# Patient Record
Sex: FEMALE
[De-identification: names, ages, dates, MRNs, and addresses within clinical notes are randomized per-mention and may not be internally consistent; named-entity substitution may affect disease eponyms.]

---

## 2017-08-30 ENCOUNTER — APPOINTMENT (OUTPATIENT)
Dept: OBGYN | Facility: CLINIC | Age: 22
End: 2017-08-30
Payer: COMMERCIAL

## 2017-08-30 VITALS
BODY MASS INDEX: 19.84 KG/M2 | HEIGHT: 63 IN | SYSTOLIC BLOOD PRESSURE: 102 MMHG | WEIGHT: 112 LBS | DIASTOLIC BLOOD PRESSURE: 64 MMHG

## 2017-08-30 DIAGNOSIS — O92.6 GALACTORRHEA: ICD-10-CM

## 2017-08-30 DIAGNOSIS — Z01.419 ENCOUNTER FOR GYNECOLOGICAL EXAMINATION (GENERAL) (ROUTINE) W/OUT ABNORMAL FINDINGS: ICD-10-CM

## 2017-08-30 PROCEDURE — 36415 COLL VENOUS BLD VENIPUNCTURE: CPT

## 2017-08-30 PROCEDURE — 99395 PREV VISIT EST AGE 18-39: CPT

## 2017-08-30 PROCEDURE — 99213 OFFICE O/P EST LOW 20 MIN: CPT | Mod: 25

## 2017-08-31 ENCOUNTER — OTHER (OUTPATIENT)
Age: 22
End: 2017-08-31

## 2017-08-31 ENCOUNTER — TRANSCRIPTION ENCOUNTER (OUTPATIENT)
Age: 22
End: 2017-08-31

## 2017-09-05 LAB
CYTOLOGY CVX/VAG DOC THIN PREP: NORMAL
PROLACTIN SERPL-MCNC: 17.7 NG/ML
TSH SERPL-ACNC: 1.85 UIU/ML

## 2024-09-20 ENCOUNTER — APPOINTMENT (OUTPATIENT)
Dept: OTOLARYNGOLOGY | Facility: CLINIC | Age: 29
End: 2024-09-20
Payer: MEDICAID

## 2024-09-20 VITALS
BODY MASS INDEX: 20.38 KG/M2 | HEIGHT: 63 IN | WEIGHT: 115 LBS | SYSTOLIC BLOOD PRESSURE: 112 MMHG | DIASTOLIC BLOOD PRESSURE: 70 MMHG | HEART RATE: 80 BPM

## 2024-09-20 DIAGNOSIS — E23.7 DISORDER OF PITUITARY GLAND, UNSPECIFIED: ICD-10-CM

## 2024-09-20 DIAGNOSIS — R42 DIZZINESS AND GIDDINESS: ICD-10-CM

## 2024-09-20 PROCEDURE — 31231 NASAL ENDOSCOPY DX: CPT

## 2024-09-20 PROCEDURE — 99204 OFFICE O/P NEW MOD 45 MIN: CPT | Mod: 25

## 2024-09-20 NOTE — PLAN
[TextEntry] : I do not believe her pituitary lesion is related to her dizziness.   I will refer her to Dr. Malone for an opinion regarding her ptiuitary lesion, but I do not recommend resection/drainage.   I recommend she continue to fu with endo given her hx of elevated PRL, now normal, but lactation.   I have referred her to otology for further management of her dizziness. I also recommended she keep her appt with psychiatric and neurology.

## 2024-09-20 NOTE — HISTORY OF PRESENT ILLNESS
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 SHe reports that she has had dysequilirbium for many months. This is significantly affecting her lifestyle. She is dizzy almost all of the time. She cannot walk. She also feels like she has to pop her ears, but she is hearing normally. She reports floaters in her eyes, but has seen ophtho who reports normal eye exam. She reports her ears feel wet. She has a history of lactation several years ago - at that time had elevated PRL. Her most recent prolactin level is WNL (20s). She has tried dramamine, which makes her dizziness worse. She has been to the ER several times. 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

## 2024-10-01 ENCOUNTER — APPOINTMENT (OUTPATIENT)
Dept: OTOLARYNGOLOGY | Facility: CLINIC | Age: 29
End: 2024-10-01
Payer: MEDICAID

## 2024-10-01 VITALS — HEIGHT: 63 IN | WEIGHT: 115 LBS | BODY MASS INDEX: 20.38 KG/M2

## 2024-10-01 DIAGNOSIS — F41.9 ANXIETY DISORDER, UNSPECIFIED: ICD-10-CM

## 2024-10-01 DIAGNOSIS — G43.809 OTHER MIGRAINE, NOT INTRACTABLE, W/OUT STATUS MIGRAINOSUS: ICD-10-CM

## 2024-10-01 DIAGNOSIS — H90.12 CONDUCTIVE HEARING LOSS, UNILATERAL, LEFT EAR, WITH UNRESTRICTED HEARING ON THE CONTRALATERAL SIDE: ICD-10-CM

## 2024-10-01 PROCEDURE — 92557 COMPREHENSIVE HEARING TEST: CPT

## 2024-10-01 PROCEDURE — 99214 OFFICE O/P EST MOD 30 MIN: CPT

## 2024-10-01 PROCEDURE — 92567 TYMPANOMETRY: CPT

## 2024-10-01 PROCEDURE — 92504 EAR MICROSCOPY EXAMINATION: CPT

## 2024-10-01 RX ORDER — ZOLPIDEM TARTRATE 5 MG/1
5 TABLET ORAL
Qty: 30 | Refills: 0 | Status: ACTIVE | COMMUNITY
Start: 2024-10-01 | End: 1900-01-01

## 2024-10-01 RX ORDER — ESCITALOPRAM OXALATE 5 MG/1
5 TABLET ORAL
Qty: 90 | Refills: 3 | Status: ACTIVE | COMMUNITY
Start: 2024-10-01 | End: 1900-01-01

## 2024-10-01 NOTE — HISTORY OF PRESENT ILLNESS
[de-identified] : 29 year old female referred by Dr. Serra for dizziness Reports that she has had disequilibrium since 8/18/24, came on randomly where she couldnt stand or walk due to the entire room spinning She started taking Inner Ear Plus, vitamin supplement, which has helped improve her dizziness however she is still experiencing dizziness Wearing dizzy bands daily This is severely impacting her life  She will get hot/cold and "trembly," feels like shes floating on a cloud Has constant tinnitus Feels a sensation that her "head is bleeding," down into her neck and back when she feels dizziness Went to ER x3 who gave her xanax making anxiety and symptoms worse She does not feel she suffers from anxiety but family member with her today thinks otherwise  When she uses flonase she feels better Denies otalgia, otorrhea, ear infections

## 2024-10-01 NOTE — DATA REVIEWED
[de-identified] : An audiogram was ordered and performed including pure tones, tympanometry and speech testing for the patients complaint of L hearing loss, vertigo I have independently reviewed the patient's audiogram from today and my findings include L CHL

## 2024-10-01 NOTE — HISTORY OF PRESENT ILLNESS
[de-identified] : 29 year old female referred by Dr. Serra for dizziness Reports that she has had disequilibrium since 8/18/24, came on randomly where she couldnt stand or walk due to the entire room spinning She started taking Inner Ear Plus, vitamin supplement, which has helped improve her dizziness however she is still experiencing dizziness Wearing dizzy bands daily This is severely impacting her life  She will get hot/cold and "trembly," feels like shes floating on a cloud Has constant tinnitus Feels a sensation that her "head is bleeding," down into her neck and back when she feels dizziness Went to ER x3 who gave her xanax making anxiety and symptoms worse She does not feel she suffers from anxiety but family member with her today thinks otherwise  When she uses flonase she feels better Denies otalgia, otorrhea, ear infections

## 2024-10-01 NOTE — PHYSICAL EXAM
[Binocular Microscopic Exam] : Binocular microscopic exam was performed [Hearing Meier Test (Tuning Fork On Forehead)] : no lateralization of tone [Hearing Loss Right Only] : normal [Hearing Loss Left Only] : normal [Rinne Test Air Conduction Persists > Bone Conduction Right] : air conduction greater than bone conduction on the right [Rinne Test Air Conduction Persists > Bone Conduction Left] : bone conduction greater than air conduction on the left [Meier Test Lateralizes To Left] : tone lateralization to the left [Nystagmus] : ~T no ~M nystagmus was seen [Fukuda Step Test] : Fukuda Step Test was Positive [Romberg's Sign] : Romberg's sign was absent [Fistula Sign] : Fistula Sign: Negative [Past-Pointing] : Past-Pointing: Negative [Valarie-Hallmaryanneke] : Telephone-Hallpike: Negative [FreeTextEntry5] : TF to L, BC>AC on L [FreeTextEntry1] : +fukuda to L [Normal] : no rashes

## 2024-10-01 NOTE — DATA REVIEWED
[de-identified] : An audiogram was ordered and performed including pure tones, tympanometry and speech testing for the patients complaint of L hearing loss, vertigo I have independently reviewed the patient's audiogram from today and my findings include L CHL

## 2024-10-01 NOTE — PHYSICAL EXAM
[Binocular Microscopic Exam] : Binocular microscopic exam was performed [Hearing Meier Test (Tuning Fork On Forehead)] : no lateralization of tone [Hearing Loss Right Only] : normal [Hearing Loss Left Only] : normal [Rinne Test Air Conduction Persists > Bone Conduction Right] : air conduction greater than bone conduction on the right [Rinne Test Air Conduction Persists > Bone Conduction Left] : bone conduction greater than air conduction on the left [Meier Test Lateralizes To Left] : tone lateralization to the left [Nystagmus] : ~T no ~M nystagmus was seen [Fukuda Step Test] : Fukuda Step Test was Positive [Romberg's Sign] : Romberg's sign was absent [Fistula Sign] : Fistula Sign: Negative [Past-Pointing] : Past-Pointing: Negative [Valarie-Hallmaryanneke] : [place]-Hallpike: Negative [FreeTextEntry5] : TF to L, BC>AC on L [FreeTextEntry1] : +fukuda to L [Normal] : no rashes

## 2024-10-16 ENCOUNTER — APPOINTMENT (OUTPATIENT)
Dept: NEUROSURGERY | Facility: CLINIC | Age: 29
End: 2024-10-16
Payer: MEDICAID

## 2024-10-16 VITALS
HEIGHT: 63 IN | BODY MASS INDEX: 20.38 KG/M2 | HEART RATE: 69 BPM | OXYGEN SATURATION: 100 % | WEIGHT: 115 LBS | SYSTOLIC BLOOD PRESSURE: 99 MMHG | DIASTOLIC BLOOD PRESSURE: 66 MMHG

## 2024-10-16 DIAGNOSIS — R42 DIZZINESS AND GIDDINESS: ICD-10-CM

## 2024-10-16 DIAGNOSIS — N64.3 GALACTORRHEA NOT ASSOCIATED WITH CHILDBIRTH: ICD-10-CM

## 2024-10-16 DIAGNOSIS — E23.7 DISORDER OF PITUITARY GLAND, UNSPECIFIED: ICD-10-CM

## 2024-10-16 DIAGNOSIS — F41.9 ANXIETY DISORDER, UNSPECIFIED: ICD-10-CM

## 2024-10-16 DIAGNOSIS — H90.12 CONDUCTIVE HEARING LOSS, UNILATERAL, LEFT EAR, WITH UNRESTRICTED HEARING ON THE CONTRALATERAL SIDE: ICD-10-CM

## 2024-10-16 PROCEDURE — 99203 OFFICE O/P NEW LOW 30 MIN: CPT

## 2024-10-17 PROBLEM — N64.3 GALACTORRHEA: Status: ACTIVE | Noted: 2017-08-30

## 2024-10-31 ENCOUNTER — APPOINTMENT (OUTPATIENT)
Dept: ENDOCRINOLOGY | Facility: CLINIC | Age: 29
End: 2024-10-31

## 2024-11-15 ENCOUNTER — APPOINTMENT (OUTPATIENT)
Dept: OTOLARYNGOLOGY | Facility: CLINIC | Age: 29
End: 2024-11-15
Payer: MEDICAID

## 2024-11-15 VITALS
SYSTOLIC BLOOD PRESSURE: 103 MMHG | RESPIRATION RATE: 17 BRPM | DIASTOLIC BLOOD PRESSURE: 67 MMHG | WEIGHT: 115 LBS | HEIGHT: 63 IN | HEART RATE: 76 BPM | OXYGEN SATURATION: 97 % | BODY MASS INDEX: 20.38 KG/M2

## 2024-11-15 DIAGNOSIS — G43.809 OTHER MIGRAINE, NOT INTRACTABLE, W/OUT STATUS MIGRAINOSUS: ICD-10-CM

## 2024-11-15 DIAGNOSIS — F41.9 ANXIETY DISORDER, UNSPECIFIED: ICD-10-CM

## 2024-11-15 DIAGNOSIS — R42 DIZZINESS AND GIDDINESS: ICD-10-CM

## 2024-11-15 PROCEDURE — 99213 OFFICE O/P EST LOW 20 MIN: CPT

## 2024-12-03 ENCOUNTER — APPOINTMENT (OUTPATIENT)
Dept: OTOLARYNGOLOGY | Facility: CLINIC | Age: 29
End: 2024-12-03

## 2024-12-17 ENCOUNTER — APPOINTMENT (OUTPATIENT)
Dept: CARDIOLOGY | Facility: CLINIC | Age: 29
End: 2024-12-17
Payer: COMMERCIAL

## 2024-12-17 ENCOUNTER — NON-APPOINTMENT (OUTPATIENT)
Age: 29
End: 2024-12-17

## 2024-12-17 VITALS
TEMPERATURE: 97.9 F | RESPIRATION RATE: 17 BRPM | SYSTOLIC BLOOD PRESSURE: 93 MMHG | WEIGHT: 108 LBS | BODY MASS INDEX: 19.14 KG/M2 | OXYGEN SATURATION: 99 % | HEIGHT: 63 IN | DIASTOLIC BLOOD PRESSURE: 60 MMHG | HEART RATE: 70 BPM

## 2024-12-17 DIAGNOSIS — R07.89 OTHER CHEST PAIN: ICD-10-CM

## 2024-12-17 PROCEDURE — 99204 OFFICE O/P NEW MOD 45 MIN: CPT | Mod: 25

## 2024-12-17 PROCEDURE — 93000 ELECTROCARDIOGRAM COMPLETE: CPT

## 2025-01-23 ENCOUNTER — APPOINTMENT (OUTPATIENT)
Dept: CARDIOLOGY | Facility: CLINIC | Age: 30
End: 2025-01-23
Payer: MEDICAID

## 2025-01-23 PROCEDURE — 93306 TTE W/DOPPLER COMPLETE: CPT

## 2025-01-29 ENCOUNTER — APPOINTMENT (OUTPATIENT)
Dept: PULMONOLOGY | Facility: CLINIC | Age: 30
End: 2025-01-29
Payer: MEDICAID

## 2025-01-29 VITALS
DIASTOLIC BLOOD PRESSURE: 78 MMHG | HEIGHT: 63 IN | HEART RATE: 72 BPM | OXYGEN SATURATION: 99 % | WEIGHT: 115 LBS | SYSTOLIC BLOOD PRESSURE: 118 MMHG | BODY MASS INDEX: 20.38 KG/M2

## 2025-01-29 DIAGNOSIS — R07.89 OTHER CHEST PAIN: ICD-10-CM

## 2025-01-29 PROCEDURE — 94729 DIFFUSING CAPACITY: CPT

## 2025-01-29 PROCEDURE — 99205 OFFICE O/P NEW HI 60 MIN: CPT | Mod: 25

## 2025-01-29 PROCEDURE — 94010 BREATHING CAPACITY TEST: CPT

## 2025-01-29 PROCEDURE — 94727 GAS DIL/WSHOT DETER LNG VOL: CPT

## 2025-01-29 RX ORDER — ALBUTEROL SULFATE 90 UG/1
108 (90 BASE) INHALANT RESPIRATORY (INHALATION)
Qty: 1 | Refills: 2 | Status: ACTIVE | COMMUNITY
Start: 2025-01-29 | End: 1900-01-01

## 2025-02-07 ENCOUNTER — APPOINTMENT (OUTPATIENT)
Dept: CT IMAGING | Facility: CLINIC | Age: 30
End: 2025-02-07

## 2025-02-12 ENCOUNTER — APPOINTMENT (OUTPATIENT)
Dept: OTOLARYNGOLOGY | Facility: CLINIC | Age: 30
End: 2025-02-12
Payer: MEDICAID

## 2025-02-12 ENCOUNTER — APPOINTMENT (OUTPATIENT)
Dept: PULMONOLOGY | Facility: CLINIC | Age: 30
End: 2025-02-12

## 2025-02-12 PROCEDURE — 98013 SYNCH AUDIO-ONLY EST LOW 20: CPT

## 2025-02-26 ENCOUNTER — APPOINTMENT (OUTPATIENT)
Dept: CT IMAGING | Facility: CLINIC | Age: 30
End: 2025-02-26
Payer: MEDICAID

## 2025-02-26 PROCEDURE — 71275 CT ANGIOGRAPHY CHEST: CPT

## 2025-03-05 ENCOUNTER — APPOINTMENT (OUTPATIENT)
Dept: PULMONOLOGY | Facility: CLINIC | Age: 30
End: 2025-03-05
Payer: MEDICAID

## 2025-03-05 PROCEDURE — 99213 OFFICE O/P EST LOW 20 MIN: CPT | Mod: 95

## 2025-03-06 DIAGNOSIS — R06.2 WHEEZING: ICD-10-CM

## 2025-03-06 RX ORDER — FLUTICASONE FUROATE 100 UG/1
100 POWDER RESPIRATORY (INHALATION)
Qty: 1 | Refills: 3 | Status: ACTIVE | COMMUNITY
Start: 2025-03-06 | End: 1900-01-01

## 2025-03-06 RX ORDER — LEVALBUTEROL TARTRATE 45 UG/1
45 AEROSOL, METERED ORAL
Qty: 1 | Refills: 2 | Status: ACTIVE | COMMUNITY
Start: 2025-03-06 | End: 1900-01-01

## 2025-03-11 RX ORDER — LEVALBUTEROL TARTRATE 45 UG/1
45 AEROSOL, METERED ORAL
Qty: 1 | Refills: 2 | Status: ACTIVE | COMMUNITY
Start: 2025-03-11 | End: 1900-01-01

## 2025-03-26 ENCOUNTER — APPOINTMENT (OUTPATIENT)
Dept: OTOLARYNGOLOGY | Facility: CLINIC | Age: 30
End: 2025-03-26
Payer: MEDICAID

## 2025-03-26 PROCEDURE — 99212 OFFICE O/P EST SF 10 MIN: CPT

## 2025-06-19 ENCOUNTER — APPOINTMENT (OUTPATIENT)
Dept: PULMONOLOGY | Facility: CLINIC | Age: 30
End: 2025-06-19
Payer: MEDICAID

## 2025-06-19 VITALS
DIASTOLIC BLOOD PRESSURE: 67 MMHG | WEIGHT: 119 LBS | OXYGEN SATURATION: 98 % | HEIGHT: 63 IN | BODY MASS INDEX: 21.09 KG/M2 | SYSTOLIC BLOOD PRESSURE: 106 MMHG | HEART RATE: 78 BPM | RESPIRATION RATE: 16 BRPM

## 2025-06-19 PROBLEM — R09.81 SINUS CONGESTION: Status: ACTIVE | Noted: 2025-06-19

## 2025-06-19 PROCEDURE — 99215 OFFICE O/P EST HI 40 MIN: CPT

## 2025-06-19 PROCEDURE — 94010 BREATHING CAPACITY TEST: CPT

## 2025-06-19 RX ORDER — MOMETASONE FUROATE 100 UG/1
100 AEROSOL RESPIRATORY (INHALATION) TWICE DAILY
Qty: 1 | Refills: 2 | Status: ACTIVE | COMMUNITY
Start: 2025-06-19 | End: 1900-01-01

## 2025-06-19 RX ORDER — LEVOCETIRIZINE DIHYDROCHLORIDE 5 MG/1
5 TABLET ORAL
Qty: 90 | Refills: 2 | Status: ACTIVE | COMMUNITY
Start: 2025-06-19 | End: 1900-01-01

## 2025-06-19 RX ORDER — IPRATROPIUM BROMIDE 21 UG/1
0.03 SPRAY, METERED NASAL
Qty: 1 | Refills: 1 | Status: ACTIVE | COMMUNITY
Start: 2025-06-19 | End: 1900-01-01

## 2025-06-24 ENCOUNTER — APPOINTMENT (OUTPATIENT)
Dept: OTOLARYNGOLOGY | Facility: CLINIC | Age: 30
End: 2025-06-24
Payer: MEDICAID

## 2025-06-24 VITALS
HEART RATE: 67 BPM | SYSTOLIC BLOOD PRESSURE: 108 MMHG | BODY MASS INDEX: 21.09 KG/M2 | OXYGEN SATURATION: 100 % | WEIGHT: 119 LBS | DIASTOLIC BLOOD PRESSURE: 66 MMHG | HEIGHT: 63 IN

## 2025-06-24 PROBLEM — R06.00 DYSPNEA: Status: ACTIVE | Noted: 2025-06-24

## 2025-06-24 PROCEDURE — 99205 OFFICE O/P NEW HI 60 MIN: CPT

## 2025-06-24 PROCEDURE — 99215 OFFICE O/P EST HI 40 MIN: CPT

## 2025-07-02 ENCOUNTER — APPOINTMENT (OUTPATIENT)
Dept: OTOLARYNGOLOGY | Facility: CLINIC | Age: 30
End: 2025-07-02
Payer: MEDICAID

## 2025-07-02 PROCEDURE — 99213 OFFICE O/P EST LOW 20 MIN: CPT | Mod: 93

## 2025-08-26 ENCOUNTER — APPOINTMENT (OUTPATIENT)
Dept: PULMONOLOGY | Facility: CLINIC | Age: 30
End: 2025-08-26
Payer: MEDICAID

## 2025-08-26 VITALS
HEIGHT: 63 IN | HEART RATE: 59 BPM | BODY MASS INDEX: 21.01 KG/M2 | RESPIRATION RATE: 18 BRPM | TEMPERATURE: 98.3 F | SYSTOLIC BLOOD PRESSURE: 102 MMHG | OXYGEN SATURATION: 99 % | DIASTOLIC BLOOD PRESSURE: 58 MMHG | WEIGHT: 118.56 LBS

## 2025-08-26 DIAGNOSIS — J30.9 ALLERGIC RHINITIS, UNSPECIFIED: ICD-10-CM

## 2025-08-26 DIAGNOSIS — R07.89 OTHER CHEST PAIN: ICD-10-CM

## 2025-08-26 DIAGNOSIS — R06.2 WHEEZING: ICD-10-CM

## 2025-08-26 PROCEDURE — 99215 OFFICE O/P EST HI 40 MIN: CPT

## 2025-08-26 PROCEDURE — G2211 COMPLEX E/M VISIT ADD ON: CPT | Mod: NC

## 2025-08-26 RX ORDER — MONTELUKAST 10 MG/1
10 TABLET, FILM COATED ORAL
Qty: 30 | Refills: 3 | Status: ACTIVE | COMMUNITY
Start: 2025-08-26 | End: 1900-01-01